# Patient Record
Sex: MALE | Race: WHITE | Employment: FULL TIME | URBAN - METROPOLITAN AREA
[De-identification: names, ages, dates, MRNs, and addresses within clinical notes are randomized per-mention and may not be internally consistent; named-entity substitution may affect disease eponyms.]

---

## 2018-04-09 ENCOUNTER — HOSPITAL ENCOUNTER (OUTPATIENT)
Dept: OCCUPATIONAL MEDICINE | Age: 33
Discharge: HOME | End: 2018-04-09

## 2018-04-09 DIAGNOSIS — T14.90XA TRAUMATIC DEFORMITY: ICD-10-CM

## 2019-01-11 ENCOUNTER — HOSPITAL ENCOUNTER (OUTPATIENT)
Dept: OCCUPATIONAL MEDICINE | Age: 34
Discharge: HOME | End: 2019-01-11

## 2019-01-11 DIAGNOSIS — T14.90XA INJURY: ICD-10-CM

## 2023-01-08 ENCOUNTER — HOSPITAL ENCOUNTER (EMERGENCY)
Age: 38
Discharge: HOME | End: 2023-01-08
Attending: EMERGENCY MEDICINE
Payer: COMMERCIAL

## 2023-01-08 ENCOUNTER — APPOINTMENT (OUTPATIENT)
Dept: GENERAL RADIOLOGY | Age: 38
End: 2023-01-08
Payer: COMMERCIAL

## 2023-01-08 VITALS
OXYGEN SATURATION: 99 % | TEMPERATURE: 98.5 F | SYSTOLIC BLOOD PRESSURE: 117 MMHG | DIASTOLIC BLOOD PRESSURE: 93 MMHG | RESPIRATION RATE: 18 BRPM | HEART RATE: 82 BPM

## 2023-01-08 DIAGNOSIS — S60.221A CONTUSION OF RIGHT HAND, INITIAL ENCOUNTER: Primary | ICD-10-CM

## 2023-01-08 DIAGNOSIS — S61.411A LACERATION OF RIGHT HAND WITHOUT FOREIGN BODY, INITIAL ENCOUNTER: ICD-10-CM

## 2023-01-08 PROCEDURE — 73130 X-RAY EXAM OF HAND: CPT

## 2023-01-08 PROCEDURE — 90714 TD VACC NO PRESV 7 YRS+ IM: CPT | Performed by: EMERGENCY MEDICINE

## 2023-01-08 PROCEDURE — 90471 IMMUNIZATION ADMIN: CPT | Performed by: EMERGENCY MEDICINE

## 2023-01-08 PROCEDURE — 6360000002 HC RX W HCPCS: Performed by: EMERGENCY MEDICINE

## 2023-01-08 PROCEDURE — 99284 EMERGENCY DEPT VISIT MOD MDM: CPT

## 2023-01-08 RX ORDER — TETANUS AND DIPHTHERIA TOXOIDS ADSORBED 2; 2 [LF]/.5ML; [LF]/.5ML
0.5 INJECTION INTRAMUSCULAR
Status: DISCONTINUED | OUTPATIENT
Start: 2023-01-08 | End: 2023-01-08 | Stop reason: RX

## 2023-01-08 RX ADMIN — CLOSTRIDIUM TETANI TOXOID ANTIGEN (FORMALDEHYDE INACTIVATED) AND CORYNEBACTERIUM DIPHTHERIAE TOXOID ANTIGEN (FORMALDEHYDE INACTIVATED) 0.5 ML: 5; 2 INJECTION, SUSPENSION INTRAMUSCULAR at 11:24

## 2023-01-08 ASSESSMENT — PAIN - FUNCTIONAL ASSESSMENT: PAIN_FUNCTIONAL_ASSESSMENT: 0-10

## 2023-01-08 ASSESSMENT — ENCOUNTER SYMPTOMS
ABDOMINAL PAIN: 0
VOMITING: 0
SHORTNESS OF BREATH: 0
NAUSEA: 0

## 2023-01-08 ASSESSMENT — PAIN DESCRIPTION - LOCATION: LOCATION: HAND

## 2023-01-08 NOTE — DISCHARGE INSTRUCTIONS
Your XR did not show any signs of fracture today. Keep the wound clean and dry, apply neosporin to wound. Use the wrist brace for additional protection/immobilization for the next several days. Ice and elevate to help with pain or swelling. Alternate tylenol and motrin as needed for pain. You can take tylenol every 4 hours as needed. You can take ibuprofen every 6 hours as needed. Watch for signs of infection including but not limited to fever, redness, warmth, purulent discharge from the wound, swelling, red streaking. Follow-up with your PCP in 1 to 2 days if no improvement. Return to the ER for any new or worsening symptoms.

## 2023-01-08 NOTE — ED PROVIDER NOTES
Emergency Department Provider Note                   PCP:                Jelena Mejia MD               Age: 42 y.o. Sex: male       ICD-10-CM    1. Contusion of right hand, initial encounter  S60.221A       2. Laceration of right hand without foreign body, initial encounter  S61.411A           DISPOSITION Decision To Discharge 01/08/2023 11:35:19 AM        Medical Decision Making      Overall patient is a well-appearing 68-year-old male who presented to the emergency department today for complaint of hand injury. On exam he has a triangular-shaped laceration to the palmar surface of the right hand that appears to be here and healing well via secondary intention. There is no indication for laceration repair at this time, especially given the delayed presentation. There is no surrounding erythema or induration, no drainage from the wound. X-ray negative for fracture or foreign body. Discussed local wound care at home, patient did have some soreness to the right hand, he was placed in a wrist splint to immobilize and provide some additional support to the hand over the next few days until his symptoms improve. He will return for any new or worsening symptoms. Is updated today. Amount and/or Complexity of Data Reviewed  Radiology: ordered. Risk  Prescription drug management. Complexity of Problem: 1 self limited or minor problem. (2)  I have conducted an independent ordering and review of X-rays. Considerations: Shared decision making was utilized in the care of this patient. Patient discharged in stable condition. ED Course as of 01/08/23 1722   Kameron Rojas Jan 08, 2023   1026 FINDINGS: There is no evidence of acute fracture or dislocation. There is no  significant soft tissue swelling. There is no bony destruction.      IMPRESSION:  Unremarkable exam. [KE]      ED Course User Index  [KE] ASHLEY Martinez        Orders Placed This Encounter   Procedures    XR HAND RIGHT (MIN 3 VIEWS) [facility] [employer] [facility] Wrist Brace, Right        Medications   tetanus & diphtheria toxoids (adult) 5-2 LFU injection 0.5 mL (0.5 mLs IntraMUSCular Given 1/8/23 1124)       There are no discharge medications for this patient. Bakari Russell is a 42 y.o. male who presents to the Emergency Department with chief complaint of    Chief Complaint   Patient presents with    Hand Injury      57-year-old male presents to the emergency department today for evaluation of right hand injury. Patient states that 4 days ago a forklift wheel fell onto his left hand and he suffered a laceration on the palmar surface. He states he has been trying to let the wound heal at home but he has had some increased pain in the right hand. He went to urgent care this morning for evaluation, but they sent him here as they did not have x-ray available. Patient states he is right-hand dominant. He reports intermittent paresthesias of the third and fourth digits of this hand, but no symptoms here currently. He denies any decreased range of motion. Denies any drainage from the wound. Unsure if his laceration requires sutures at this point. Denies any pain in the wrist.  Patient unsure of last tetanus immunization. The history is provided by the patient. No  was used. Review of Systems   Constitutional:  Negative for activity change and fever. HENT:  Negative for congestion. Eyes:  Negative for visual disturbance. Respiratory:  Negative for shortness of breath. Cardiovascular:  Negative for chest pain. Gastrointestinal:  Negative for abdominal pain, nausea and vomiting. Genitourinary:  Negative for dysuria. Musculoskeletal:         Right hand injury   Skin:  Negative for wound. Allergic/Immunologic: Negative for immunocompromised state. Neurological:  Negative for dizziness and headaches. No past medical history on file. No past surgical history on file.      No family history on file. Social History     Socioeconomic History    Marital status:          Patient has no known allergies. There are no discharge medications for this patient. Vitals signs and nursing note reviewed. No data found. Physical Exam  Vitals and nursing note reviewed. Constitutional:       General: He is not in acute distress. Appearance: Normal appearance. HENT:      Head: Normocephalic and atraumatic. Nose: Nose normal.   Eyes:      Extraocular Movements: Extraocular movements intact. Conjunctiva/sclera: Conjunctivae normal.      Pupils: Pupils are equal, round, and reactive to light. Cardiovascular:      Rate and Rhythm: Normal rate and regular rhythm. Heart sounds: No murmur heard. No friction rub. No gallop. Pulmonary:      Effort: Pulmonary effort is normal.      Breath sounds: No wheezing, rhonchi or rales. Musculoskeletal:      Right hand: Laceration and tenderness present. No swelling. Normal strength. Normal sensation. Normal capillary refill. Hands:       Cervical back: Normal range of motion. Skin:     General: Skin is warm and dry. Capillary Refill: Capillary refill takes less than 2 seconds. Neurological:      General: No focal deficit present. Mental Status: He is alert and oriented to person, place, and time. Sensory: No sensory deficit. Motor: No weakness. Gait: Gait normal.   Psychiatric:         Mood and Affect: Mood normal.         Thought Content: Thought content normal.         Judgment: Judgment normal.        Procedures    Results for orders placed or performed during the hospital encounter of 01/08/23   XR HAND RIGHT (MIN 3 VIEWS)    Narrative    Right hand series    HISTORY: wheel fell on hand 4 days ago, laceration to palmar surface    3 views were obtained. Comparison: None    FINDINGS: There is no evidence of acute fracture or dislocation.  There is no  significant soft tissue swelling. There is no bony destruction. Impression    Unremarkable exam.        XR HAND RIGHT (MIN 3 VIEWS)   Final Result   Unremarkable exam.                          Voice dictation software was used during the making of this note. This software is not perfect and grammatical and other typographical errors may be present. This note has not been completely proofread for errors.        Jonathan Martinez  01/08/23 [id number]

## 2023-01-08 NOTE — ED NOTES
I have reviewed discharge instructions with the patient. The patient verbalized understanding. Patient left ED via Discharge Method: ambulatory to Home with (insert name of family/friend, self, transport family). Opportunity for questions and clarification provided. Patient given 0 scripts. To continue your aftercare when you leave the hospital, you may receive an automated call from our care team to check in on how you are doing. This is a free service and part of our promise to provide the best care and service to meet your aftercare needs. \" If you have questions, or wish to unsubscribe from this service please call [phone]. Thank you for Choosing our [facility] Emergency Department.         Pat Swenson RN  01/08/23 5053

## 2023-01-08 NOTE — ED TRIAGE NOTES
Patient ambulatory to triage with [place] right hand injury. Went to urgent care, told he would need stitches and an xray and sent here. Reports injury occurred on 1/5.